# Patient Record
Sex: MALE | Race: WHITE | Employment: OTHER | URBAN - METROPOLITAN AREA
[De-identification: names, ages, dates, MRNs, and addresses within clinical notes are randomized per-mention and may not be internally consistent; named-entity substitution may affect disease eponyms.]

---

## 2022-09-21 ENCOUNTER — APPOINTMENT (OUTPATIENT)
Dept: GENERAL RADIOLOGY | Facility: HOSPITAL | Age: 42
End: 2022-09-21
Attending: EMERGENCY MEDICINE

## 2022-09-21 PROCEDURE — 71045 X-RAY EXAM CHEST 1 VIEW: CPT | Performed by: EMERGENCY MEDICINE

## 2022-09-21 NOTE — ED INITIAL ASSESSMENT (HPI)
Pt to ED via [place] [facility] c/o difficulty breathing. Pt reports he has been around his niece who recently tested positive for covid. Pt admits to ETOH today.

## 2022-09-21 NOTE — ED QUICK NOTES
Patient is A/O x 4. Ambulatory with steady gait.   Okay to be discharged with juan david lewis per ED MD